# Patient Record
Sex: MALE | Race: WHITE | URBAN - METROPOLITAN AREA
[De-identification: names, ages, dates, MRNs, and addresses within clinical notes are randomized per-mention and may not be internally consistent; named-entity substitution may affect disease eponyms.]

---

## 2020-01-09 ENCOUNTER — OFFICE VISIT (OUTPATIENT)
Dept: ORTHOPEDIC SURGERY | Age: 45
End: 2020-01-09
Payer: COMMERCIAL

## 2020-01-09 VITALS
BODY MASS INDEX: 36.6 KG/M2 | HEIGHT: 77 IN | WEIGHT: 310 LBS | HEART RATE: 68 BPM | SYSTOLIC BLOOD PRESSURE: 109 MMHG | DIASTOLIC BLOOD PRESSURE: 66 MMHG

## 2020-01-09 PROCEDURE — 20610 DRAIN/INJ JOINT/BURSA W/O US: CPT | Performed by: ORTHOPAEDIC SURGERY

## 2020-01-09 PROCEDURE — 99204 OFFICE O/P NEW MOD 45 MIN: CPT | Performed by: ORTHOPAEDIC SURGERY

## 2020-01-09 RX ORDER — VALSARTAN AND HYDROCHLOROTHIAZIDE 160; 25 MG/1; MG/1
TABLET ORAL
COMMUNITY
Start: 2019-11-04

## 2020-01-09 RX ORDER — DOXYCYCLINE HYCLATE 100 MG
TABLET ORAL
COMMUNITY
Start: 2019-11-04

## 2020-01-09 RX ORDER — CELECOXIB 200 MG/1
CAPSULE ORAL
COMMUNITY
Start: 2019-11-04

## 2020-01-09 RX ORDER — ATORVASTATIN CALCIUM 10 MG/1
TABLET, FILM COATED ORAL
COMMUNITY
Start: 2019-11-04

## 2020-01-09 RX ADMIN — METHYLPREDNISOLONE ACETATE 40 MG: 40 INJECTION, SUSPENSION INTRA-ARTICULAR; INTRALESIONAL; INTRAMUSCULAR; SOFT TISSUE at 11:46

## 2020-01-09 ASSESSMENT — ENCOUNTER SYMPTOMS
GASTROINTESTINAL NEGATIVE: 1
RESPIRATORY NEGATIVE: 1
EYES NEGATIVE: 1
BACK PAIN: 1
ALLERGIC/IMMUNOLOGIC NEGATIVE: 1

## 2020-01-09 NOTE — PROGRESS NOTES
medical history on file. No past surgical history on file. No family history on file. Social History     Socioeconomic History    Marital status:      Spouse name: Not on file    Number of children: Not on file    Years of education: Not on file    Highest education level: Not on file   Occupational History    Not on file   Social Needs    Financial resource strain: Not on file    Food insecurity:     Worry: Not on file     Inability: Not on file    Transportation needs:     Medical: Not on file     Non-medical: Not on file   Tobacco Use    Smoking status: Not on file   Substance and Sexual Activity    Alcohol use: Not on file    Drug use: Not on file    Sexual activity: Not on file   Lifestyle    Physical activity:     Days per week: Not on file     Minutes per session: Not on file    Stress: Not on file   Relationships    Social connections:     Talks on phone: Not on file     Gets together: Not on file     Attends Che[employer] service: Not on file     Active member of club or organization: Not on file     Attends meetings of clubs or organizations: Not on file     Relationship status: Not on file    Intimate partner violence:     Fear of current or ex partner: Not on file     Emotionally abused: Not on file     Physically abused: Not on file     Forced sexual activity: Not on file   Other Topics Concern    Not on file   Social History Narrative    Not on file       Current Outpatient Medications   Medication Sig Dispense Refill    atorvastatin (LIPITOR) 10 MG tablet       celecoxib (CELEBREX) 200 MG capsule       HUMALOG 100 UNIT/ML injection vial       doxycycline hyclate (VIBRA-TABS) 100 MG tablet       valsartan-hydrochlorothiazide (DIOVAN-HCT) 160-25 MG per tablet        No current facility-administered medications for this visit.         No Known Allergies      Review of Systems:  A 14 point review of systems was completed by the patient and is available in the media section of the scanned medical record and was reviewed on 1/9/2020. The review is negative with the exception of those things mentioned in the HPI and Past Medical History   Review of Systems       Vital Signs:   /66   Pulse 68   Ht 6' 5\" (1.956 m)   Wt (!) 310 lb (140.6 kg)   BMI 36.76 kg/m²     General Exam:    General: Brenda Wong is a healthy and well appearing 38y.o. year old male who is sitting comfortably in our office in no acute distress. Neuro: Alert & Oriented x 3,  normal,  no focal deficits noted. Normal mood & affect. Eyes: Sclera clear  Ears: Normal external ear  Mouth:  No perioral lesions  Pulm: Respirations unlabored and regular   Skin: Warm, well perfused      Knee Examination: Right    Inspection: Surgical incision well-healed, no effusion, ecchymosis, discoloration, erythema, excessive warmth. no gross deformities, no signs of infection. Atrophy appreciated of right quadriceps muscle compared to left    Palpation: There is surgical incision well-healed, patellofemoral crepitus, there is medial joint line tenderness and medial proximal tibial pain with palpation. No other osseous or soft tissue tenderness around the knee. Negative calf tenderness    Range of Motion:  0-145 degrees without pain or difficulty    Strength:  4/5 quadriceps, 5/5 hamstrings    Special Tests:  No ligament instability, valgus and varus stress test are stable at 0 and 30°. Lachman's exhibits a solid endpoint. Negative posterior drawer. Negative Homans sign    Gait: Steady, Non antalgic, without the use of assistive devices    Alignment: Varus deformity    Neuro: Sensation equal bilateral lower extremities    Vascular: 2+ posterior tibialis pulse      Contralateral Knee Comparison Examination:    Inspection: Surgical incision well-healed, no effusion, ecchymosis, discoloration, erythema, excessive warmth. no gross deformities, no signs of infection.      Palpation: There is positive patellofemoral crepitus, there is medial joint line tenderness. No other osseous or soft tissue tenderness around the knee. Negative calf tenderness. Positive pain with palpation of anteromedial fat pad    Range of Motion:  0-145 degrees without pain or difficulty    Strength:  5/5 quadriceps, 5/5 hamstrings    Special Tests:  No ligament instability, valgus and varus stress test are stable at 0 and 30°. Lachman's exhibits a solid endpoint. Negative posterior drawer. Negative Homans sign    Gait: Steady, Non antalgic, without the use of assistive devices    Alignment: Varus deformity    Neuro: Sensation equal bilateral lower extremities    Vascular: 2+ posterior tibialis pulse      Radiology:     Outside images reviewed with last bilateral knee x-rays obtained from October 2018 which demonstrate stable appearing bilateral medial uni-compartment arthroplasties. No obvious evidence of loosening or subsidence of the implant with what appears to be appropriate positioning. Bone scan also reviewed which was done in October 2019 which did demonstrate mild increased uptake in the medial portion of right knee with possible aspectic loosening of implants. Office Procedures:  No orders of the defined types were placed in this encounter. Assessment: Patient is a 38 y.o. male presenting with bilateral painful medial unicompartmental arthroplasties performed by outside surgeon in 2018. The patient has very large stature at 6'6'' and 330 pounds. Right knee: Patient did have pulmonary work-up to rule out infection with CBC, ESR, CRP which were all negative. Still would like to rule out possible infection with tagged white blood cell bone scan. Due to patient size, we are concerned for possible tibial component loosening or possible occult subchondral stress of the medial tibial plateau. Left knee: Patient appears to have significant tenderness along with impingement of anterior medial fat pad.     Visit Diagnoses

## 2020-01-10 RX ORDER — METHYLPREDNISOLONE ACETATE 40 MG/ML
40 INJECTION, SUSPENSION INTRA-ARTICULAR; INTRALESIONAL; INTRAMUSCULAR; SOFT TISSUE ONCE
Status: COMPLETED | OUTPATIENT
Start: 2020-01-10 | End: 2020-01-09

## 2020-01-10 SDOH — HEALTH STABILITY: MENTAL HEALTH: HOW OFTEN DO YOU HAVE A DRINK CONTAINING ALCOHOL?: NEVER

## 2020-01-15 ENCOUNTER — TELEPHONE (OUTPATIENT)
Dept: ORTHOPEDIC SURGERY | Age: 45
End: 2020-01-15

## 2020-03-26 ENCOUNTER — TELEPHONE (OUTPATIENT)
Dept: ORTHOPEDIC SURGERY | Age: 45
End: 2020-03-26